# Patient Record
Sex: FEMALE | Race: WHITE
[De-identification: names, ages, dates, MRNs, and addresses within clinical notes are randomized per-mention and may not be internally consistent; named-entity substitution may affect disease eponyms.]

---

## 2017-02-10 LAB
ANION GAP SERPL CALCULATED.3IONS-SCNC: 6 MMOL/L (ref 5–15)
APPEARANCE UR: CLEAR
BASOPHILS # BLD AUTO: 0 K/UL (ref 0–0.2)
BASOPHILS NFR BLD AUTO: 0.6 % (ref 0–2)
BILIRUB UR QL STRIP: NEGATIVE
BUN SERPL-MCNC: 18 MG/DL (ref 8–21)
CALCIUM SERPL-MCNC: 8.7 MG/DL (ref 8.4–11)
CHLORIDE SERPL-SCNC: 105 MMOL/L (ref 98–107)
COLOR UR: YELLOW
CREAT SERPL-MCNC: 0.6 MG/DL (ref 0.55–1.3)
EOSINOPHIL # BLD AUTO: 0.1 K/UL (ref 0–0.4)
EOSINOPHIL NFR BLD AUTO: 2.2 % (ref 0–4)
ERYTHROCYTE [DISTWIDTH] IN BLOOD BY AUTOMATED COUNT: 13 % (ref 9–15)
GFR SERPL CREATININE-BSD FRML MDRD: 145 ML/MIN (ref 90–?)
GLUCOSE SERPL-MCNC: 103 MG/DL (ref 70–99)
GLUCOSE UR STRIP-MCNC: NEGATIVE MG/DL
HCT VFR BLD AUTO: 41.9 % (ref 36–48)
HGB BLD-MCNC: 13.8 G/DL (ref 12–16)
HGB UR QL STRIP: NEGATIVE
KETONES UR STRIP-MCNC: NEGATIVE MG/DL
LEUKOCYTE ESTERASE UR QL STRIP: NEGATIVE
LYMPHOCYTES # BLD AUTO: 1.4 K/UL (ref 1–5.5)
LYMPHOCYTES NFR BLD AUTO: 23 % (ref 20.5–51.5)
MCH RBC QN AUTO: 27 PG (ref 27–31)
MCHC RBC AUTO-ENTMCNC: 33 % (ref 32–36)
MCV RBC AUTO: 81 FL (ref 79–98)
MONOCYTES # BLD MANUAL: 0.4 K/UL (ref 0–1)
MONOCYTES # BLD MANUAL: 6.9 % (ref 1.7–9.3)
NEUTROPHILS # BLD AUTO: 4 K/UL (ref 1.8–7.7)
NEUTROPHILS NFR BLD AUTO: 67.3 % (ref 40–70)
NITRITE UR QL STRIP: NEGATIVE
PH UR STRIP: 7 [PH] (ref 5–8)
PLATELET # BLD AUTO: 303 K/UL (ref 130–430)
POTASSIUM SERPL-SCNC: 3.8 MMOL/L (ref 3.5–5.1)
PROT UR QL STRIP: NEGATIVE
RBC # BLD AUTO: 5.15 MIL/UL (ref 4.2–6.2)
SODIUM SERPLBLD-SCNC: 140 MMOL/L (ref 136–145)
SP GR UR STRIP: 1.02 (ref 1–1.03)
UROBILINOGEN UR STRIP-MCNC: 0.2 MG/DL (ref 0.2–1)
WBC # BLD AUTO: 5.9 K/UL (ref 4.8–10.8)

## 2017-02-13 ENCOUNTER — HOSPITAL ENCOUNTER (INPATIENT)
Dept: HOSPITAL 4 - SMU | Age: 37
LOS: 1 days | Discharge: HOME | DRG: 743 | End: 2017-02-14
Attending: OBSTETRICS & GYNECOLOGY | Admitting: OBSTETRICS & GYNECOLOGY
Payer: COMMERCIAL

## 2017-02-13 VITALS
TEMPERATURE: 98.1 F | SYSTOLIC BLOOD PRESSURE: 110 MMHG | RESPIRATION RATE: 18 BRPM | OXYGEN SATURATION: 99 % | HEART RATE: 66 BPM | DIASTOLIC BLOOD PRESSURE: 68 MMHG

## 2017-02-13 VITALS — BODY MASS INDEX: 24.07 KG/M2 | WEIGHT: 141 LBS | HEIGHT: 64 IN

## 2017-02-13 VITALS
DIASTOLIC BLOOD PRESSURE: 54 MMHG | HEART RATE: 86 BPM | RESPIRATION RATE: 19 BRPM | OXYGEN SATURATION: 100 % | TEMPERATURE: 96.9 F | SYSTOLIC BLOOD PRESSURE: 93 MMHG

## 2017-02-13 VITALS
HEART RATE: 72 BPM | DIASTOLIC BLOOD PRESSURE: 52 MMHG | OXYGEN SATURATION: 95 % | TEMPERATURE: 97.8 F | RESPIRATION RATE: 18 BRPM | SYSTOLIC BLOOD PRESSURE: 97 MMHG

## 2017-02-13 DIAGNOSIS — D25.9: Primary | ICD-10-CM

## 2017-02-13 DIAGNOSIS — Z82.49: ICD-10-CM

## 2017-02-13 PROCEDURE — C9399 UNCLASSIFIED DRUGS OR BIOLOG: HCPCS

## 2017-02-13 PROCEDURE — 0UB90ZZ EXCISION OF UTERUS, OPEN APPROACH: ICD-10-PCS | Performed by: OBSTETRICS & GYNECOLOGY

## 2017-02-13 RX ADMIN — SODIUM CHLORIDE, SODIUM LACTATE, POTASSIUM CHLORIDE, AND CALCIUM CHLORIDE SCH MLS/HR: 600; 310; 30; 20 INJECTION, SOLUTION INTRAVENOUS at 17:33

## 2017-02-13 RX ADMIN — SODIUM CHLORIDE, SODIUM LACTATE, POTASSIUM CHLORIDE, AND CALCIUM CHLORIDE SCH MLS/HR: 600; 310; 30; 20 INJECTION, SOLUTION INTRAVENOUS at 15:13

## 2017-02-13 RX ADMIN — OXYCODONE HYDROCHLORIDE AND ACETAMINOPHEN PRN TAB: 5; 325 TABLET ORAL at 13:13

## 2017-02-13 RX ADMIN — OXYCODONE HYDROCHLORIDE AND ACETAMINOPHEN PRN TAB: 5; 325 TABLET ORAL at 20:02

## 2017-02-13 RX ADMIN — IBUPROFEN PRN MG: 800 TABLET ORAL at 17:37

## 2017-02-13 NOTE — NUR
NOTE:

Patient is resting comfortably in bed. no s/s of distress or sob. patient is alert and 
oriented, able to express needs, and ask for assistance. call light in reach, bed in lowest 
position, and will continue to monitor.

## 2017-02-13 NOTE — NUR
NOTE:

PATIENT IS RESTING COMFORTABLY IN BED. NO S.S OF DISTRESS OR SOB. PATIENT IS ALERT AND 
ORIENTED, ABLE TO EXPRESS NEEDS, AND ASK FOR ASSISTANCE. CALL LIGHT IN REACH, BED IN LOWEST 
POSITION, AND WILL CONTINUE TO MONITOR.

## 2017-02-13 NOTE — NUR
rounds

motrin was given for pain. call light within reached and instructed to call if pain not 
relieved.

## 2017-02-13 NOTE — NUR
Initial Note



Late entry due to patient care.



Patient c/o sharp pain in the abdomen. Administered pain medication as ordered, see eMAR. 
Patient tolerated well. Patient abdominal incision covered with dressing, CDI. No bleeding 
or discharge noted on jeb pad. Patient has a kennedy draining to gravity. Iv site patent with 
no signs or symptoms of infiltration noted at this time. Educated patient on using incentive 
spirometer, patient was able to demonstrate proper use. Educated patient on using 10 times 
per hour while awake. Call light in hand, educated patient on using call light for 
assistance, verbalized understanding.  Fall and safety precautions in place. Will continue 
to monitor.

## 2017-02-13 NOTE — NUR
ADMIT NOTE

RECEIVED PATIENT FROM [facility]. RECEIVED REPORT FROM SACHA. PATIENT ADMITTED TO MED-SURG 
WITH DIAGNOSIS OF SYMPTOMATIC FIBROIDS, UNDER THE CARE OF DR. GALLEGOS. PATIENT ORIENTED TO 
ROOM, SAFETY PRECAUTIONS, AND NURSES FOR THE DAY. CALL LIGHT IN REACH, BED IN LOWEST 
POSITION, AND WILL CONTINUE TO MONITOR.

## 2017-02-13 NOTE — NUR
closing notes

endorsed to night nurse re care. no c\o pain. no sob noted. no bleeding noted. needs 
attended.

## 2017-02-13 NOTE — NUR
assumed care

pt in bed, denies pain awake alert with ivf infusing well l forearm. resp easy unlabored. 
lower abd dressing dry and intact. jeb pad in place. no bleeding noted. kennedy intact with 
small amount jessica output.

## 2017-02-13 NOTE — NUR
RN ROUNDS



Patient in bed at this time resting with eyes closed. respirations even and unlabored. No 
acute distress noted at this time. Patient in no apparent pain or discomfort at this time. 
Call light in hand. Fall and safety precautions in place. Will continue to monitor.

## 2017-02-14 VITALS
DIASTOLIC BLOOD PRESSURE: 52 MMHG | HEART RATE: 80 BPM | OXYGEN SATURATION: 92 % | SYSTOLIC BLOOD PRESSURE: 93 MMHG | RESPIRATION RATE: 17 BRPM | TEMPERATURE: 97.8 F

## 2017-02-14 VITALS
RESPIRATION RATE: 16 BRPM | OXYGEN SATURATION: 100 % | TEMPERATURE: 98.2 F | DIASTOLIC BLOOD PRESSURE: 56 MMHG | SYSTOLIC BLOOD PRESSURE: 92 MMHG | HEART RATE: 65 BPM

## 2017-02-14 VITALS
HEART RATE: 65 BPM | TEMPERATURE: 98.2 F | OXYGEN SATURATION: 100 % | SYSTOLIC BLOOD PRESSURE: 102 MMHG | RESPIRATION RATE: 16 BRPM | DIASTOLIC BLOOD PRESSURE: 70 MMHG

## 2017-02-14 VITALS
OXYGEN SATURATION: 99 % | TEMPERATURE: 98.3 F | HEART RATE: 100 BPM | SYSTOLIC BLOOD PRESSURE: 100 MMHG | RESPIRATION RATE: 18 BRPM | DIASTOLIC BLOOD PRESSURE: 50 MMHG

## 2017-02-14 LAB
HCT VFR BLD AUTO: 25 % (ref 36–48)
HGB BLD-MCNC: 8.1 G/DL (ref 12–16)

## 2017-02-14 RX ADMIN — IBUPROFEN PRN MG: 800 TABLET ORAL at 08:40

## 2017-02-14 RX ADMIN — SIMETHICONE CHEW TAB 80 MG PRN MG: 80 TABLET ORAL at 08:40

## 2017-02-14 RX ADMIN — SODIUM CHLORIDE, SODIUM LACTATE, POTASSIUM CHLORIDE, AND CALCIUM CHLORIDE SCH MLS/HR: 600; 310; 30; 20 INJECTION, SOLUTION INTRAVENOUS at 02:16

## 2017-02-14 RX ADMIN — SIMETHICONE CHEW TAB 80 MG PRN MG: 80 TABLET ORAL at 14:08

## 2017-02-14 RX ADMIN — SODIUM CHLORIDE, SODIUM LACTATE, POTASSIUM CHLORIDE, AND CALCIUM CHLORIDE SCH MLS/HR: 600; 310; 30; 20 INJECTION, SOLUTION INTRAVENOUS at 11:07

## 2017-02-14 RX ADMIN — IBUPROFEN PRN MG: 800 TABLET ORAL at 14:08

## 2017-02-14 NOTE — NUR
RN ROUNDS



Patient in bed at this time resting, respirations even and unlabored. No acute distress 
noted at this time. Patient denies any pain or discomfort at this time. Patient has good 
urine output. Continues to tolerate IVF well. Call light in hand. Fall and safety 
precautions in place. Will continue to monitor.

## 2017-02-14 NOTE — NUR
RN ROUNDS



Patient in bed at this time resting, respirations even and unlabored. No acute distress 
noted at this time. Patient states pain is tolerable at this time. Educated patient on pain, 
and pain management. Patient stated she would call me if she felt like she needed pain 
medication. Call light in hand. Fall and safety precautions in place. Will continue to 
monitor.

## 2017-02-14 NOTE — NUR
rounds

explained to patient that dr carballo will be here by 2 pm to d/c patient. denies pain , 
ambulates at intervals in the room and julia well. no bleeding noted.

## 2017-02-14 NOTE — NUR
RN ROUNDS



Patient in bed at this time resting, respirations even and unlabored. No acute distress 
noted at this time. Patient in no apparent pain or discomfort, no facial grimacing noted. 
Call light in hand. Fall and safety precautions in place. Will continue to monitor.

## 2017-02-14 NOTE — NUR
Closing Note



Patient in bed at this time resting. Respirations even and unlabored. No acute distress 
noted at this time. Patient denies any pain or discomfort at this time. Discontinued kennedy 
catheter, catheter tip intact. Fluids discontinued as ordered per MD. All due meds given, 
all needs met. call light in hand. Fall and safety precautions in place. Will endorse to day 
shift nurse.

## 2017-02-14 NOTE — NUR
initial notes

rec patient awake alert and eating clear liquid breakfast. ivl on the l forearm intact. no 
infiltration noted. lower abdominal dressing dry and intact. no bleeding noted. voiding 
freely with min assists to the br and julia well. bed in low position and side rails up and 
locked. call light within reached.

## 2017-02-14 NOTE — NUR
closing notes

was sent home after seen by dr carballo. no sob noted. id band was removed, appt with dr carballo 
was arranged as per patient. stable. no sob noted.

## 2019-02-08 LAB
ANION GAP SERPL CALCULATED.3IONS-SCNC: 8 MMOL/L (ref 5–15)
APPEARANCE UR: CLEAR
BACTERIA URNS QL MICRO: (no result) /HPF
BASOPHILS # BLD AUTO: 0 K/UL (ref 0–0.2)
BASOPHILS NFR BLD AUTO: 0.5 % (ref 0–2)
BILIRUB UR QL STRIP: NEGATIVE
BUN SERPL-MCNC: 20 MG/DL (ref 8–21)
CALCIUM SERPL-MCNC: 8.4 MG/DL (ref 8.4–11)
CHLORIDE SERPL-SCNC: 102 MMOL/L (ref 98–107)
COLOR UR: YELLOW
CREAT SERPL-MCNC: 0.53 MG/DL (ref 0.55–1.3)
EOSINOPHIL # BLD AUTO: 0.2 K/UL (ref 0–0.4)
EOSINOPHIL NFR BLD AUTO: 2.4 % (ref 0–4)
ERYTHROCYTE [DISTWIDTH] IN BLOOD BY AUTOMATED COUNT: 12.6 % (ref 9–15)
GFR SERPL CREATININE-BSD FRML MDRD: 165 ML/MIN (ref 90–?)
GLUCOSE SERPL-MCNC: 88 MG/DL (ref 70–99)
GLUCOSE UR STRIP-MCNC: NEGATIVE MG/DL
HCT VFR BLD AUTO: 39 % (ref 36–48)
HGB BLD-MCNC: 12.5 G/DL (ref 12–16)
HGB UR QL STRIP: (no result)
KETONES UR STRIP-MCNC: (no result) MG/DL
LEUKOCYTE ESTERASE UR QL STRIP: NEGATIVE
LYMPHOCYTES # BLD AUTO: 2.7 K/UL (ref 1–5.5)
LYMPHOCYTES NFR BLD AUTO: 33.6 % (ref 20.5–51.5)
MCH RBC QN AUTO: 26 PG (ref 27–31)
MCHC RBC AUTO-ENTMCNC: 32 % (ref 32–36)
MCV RBC AUTO: 81 FL (ref 79–98)
MONOCYTES # BLD MANUAL: 0.5 K/UL (ref 0–1)
MONOCYTES # BLD MANUAL: 6 % (ref 1.7–9.3)
MUCOUS THREADS URNS QL MICRO: (no result) /LPF
NEUTROPHILS # BLD AUTO: 4.6 K/UL (ref 1.8–7.7)
NEUTROPHILS NFR BLD AUTO: 57.5 % (ref 40–70)
NITRITE UR QL STRIP: NEGATIVE
PH UR STRIP: 5.5 [PH] (ref 5–8)
PLATELET # BLD AUTO: 376 K/UL (ref 130–430)
POTASSIUM SERPL-SCNC: 3.9 MMOL/L (ref 3.5–5.1)
PROT UR QL STRIP: NEGATIVE
RBC # BLD AUTO: 4.83 MIL/UL (ref 4.2–6.2)
RBC #/AREA URNS HPF: (no result) /HPF (ref 0–3)
SODIUM SERPLBLD-SCNC: 136 MMOL/L (ref 136–145)
SP GR UR STRIP: 1.02 (ref 1–1.03)
UROBILINOGEN UR STRIP-MCNC: 0.2 MG/DL (ref 0.2–1)
WBC # BLD AUTO: 8 K/UL (ref 4.8–10.8)
WBC #/AREA URNS HPF: (no result) /HPF (ref 0–3)

## 2019-02-11 ENCOUNTER — HOSPITAL ENCOUNTER (OUTPATIENT)
Dept: HOSPITAL 4 - SDS | Age: 39
Discharge: HOME | End: 2019-02-11
Attending: OBSTETRICS & GYNECOLOGY
Payer: COMMERCIAL

## 2019-02-11 VITALS — SYSTOLIC BLOOD PRESSURE: 113 MMHG

## 2019-02-11 VITALS — WEIGHT: 150 LBS | BODY MASS INDEX: 25.61 KG/M2 | HEIGHT: 64 IN

## 2019-02-11 DIAGNOSIS — Z82.49: ICD-10-CM

## 2019-02-11 DIAGNOSIS — Z83.3: ICD-10-CM

## 2019-02-11 DIAGNOSIS — Z98.890: ICD-10-CM

## 2019-02-11 DIAGNOSIS — N84.0: Primary | ICD-10-CM

## 2019-02-11 PROCEDURE — 81000 URINALYSIS NONAUTO W/SCOPE: CPT

## 2019-02-11 PROCEDURE — 58558 HYSTEROSCOPY BIOPSY: CPT

## 2019-02-11 PROCEDURE — 80048 BASIC METABOLIC PNL TOTAL CA: CPT

## 2019-02-11 PROCEDURE — 84703 CHORIONIC GONADOTROPIN ASSAY: CPT

## 2019-02-11 PROCEDURE — 85025 COMPLETE CBC W/AUTO DIFF WBC: CPT

## 2019-02-11 PROCEDURE — 88305 TISSUE EXAM BY PATHOLOGIST: CPT

## 2019-02-11 PROCEDURE — C1819 TISSUE LOCALIZATION-EXCISION: HCPCS

## 2019-02-11 PROCEDURE — 36415 COLL VENOUS BLD VENIPUNCTURE: CPT

## 2025-04-02 ENCOUNTER — OFFICE (OUTPATIENT)
Dept: URBAN - METROPOLITAN AREA CLINIC 6 | Facility: CLINIC | Age: 45
End: 2025-04-02

## 2025-04-02 VITALS
SYSTOLIC BLOOD PRESSURE: 140 MMHG | HEART RATE: 67 BPM | WEIGHT: 159 LBS | HEIGHT: 64 IN | DIASTOLIC BLOOD PRESSURE: 91 MMHG

## 2025-04-02 DIAGNOSIS — K92.1 HEMATOCHEZIA: ICD-10-CM

## 2025-04-02 DIAGNOSIS — Z12.11 SCREENING FOR COLONIC NEOPLASIA: ICD-10-CM

## 2025-04-02 DIAGNOSIS — R19.4 ALTERED BOWEL FUNCTION: ICD-10-CM

## 2025-04-02 PROCEDURE — 99204 OFFICE O/P NEW MOD 45 MIN: CPT | Performed by: SPECIALIST

## 2025-04-02 NOTE — SERVICEHPINOTES
Patient presents with rectal bleeding. Bleeding has been present since February of this year. Patient describes large amount of blood and blood clots per rectum, almost with every bowel movement. She describes some change in bowel pattern with alternating diarrhea and constipation but has no other significant digestive symptoms. She denies any abdominal pain or anal pain. She denies any family history of colon cancer. She has never had a colonoscopy.Over the past few weeks patient mentioned that the bleeding has improved.